# Patient Record
Sex: FEMALE | Race: WHITE | NOT HISPANIC OR LATINO | Employment: UNEMPLOYED | URBAN - METROPOLITAN AREA
[De-identification: names, ages, dates, MRNs, and addresses within clinical notes are randomized per-mention and may not be internally consistent; named-entity substitution may affect disease eponyms.]

---
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
## 2024-02-28 ENCOUNTER — OFFICE VISIT (OUTPATIENT)
Dept: GASTROENTEROLOGY | Facility: CLINIC | Age: 19
End: 2024-02-28
Payer: COMMERCIAL

## 2024-02-28 VITALS
OXYGEN SATURATION: 99 % | BODY MASS INDEX: 25.52 KG/M2 | TEMPERATURE: 98.8 F | SYSTOLIC BLOOD PRESSURE: 118 MMHG | DIASTOLIC BLOOD PRESSURE: 76 MMHG | HEIGHT: 64 IN | WEIGHT: 149.5 LBS | HEART RATE: 68 BPM

## 2024-02-28 DIAGNOSIS — K31.84 GASTROPARESIS: Primary | ICD-10-CM

## 2024-02-28 PROCEDURE — 99203 OFFICE O/P NEW LOW 30 MIN: CPT | Performed by: PHYSICIAN ASSISTANT

## 2024-02-28 NOTE — PROGRESS NOTES
[facility]  Vero Pichardo 18 y.o. female MRN: [id number]       CC: Follow-up    HPI: Vero is a pleasant 18 year old female with history of previously diagnosed idiopathic gastroparesis.  Patient was last seen in our office in December 2021 by my colleague, Kira Aaron PA-C.  Patient's last gastric emptying study was in October 2021.  Patient has not been seen in the office as she reports that her symptoms resolved.  She reports that she has not utilized pantoprazole since 2021.  She has no complaints of abdominal pain, nausea or vomiting.  Patient is studying at [employer], and is a part of the [employer] program.    Last EGD and colonoscopy were performed by Dr. Jimenez in 2021.  EGD was normal in appearance.  Colonoscopy was normal in appearance up to terminal ileum.    Review of Systems:    CONSTITUTIONAL: Denies any fever, chills, or rigors. Good appetite, and no recent weight loss.  HEENT: No earache or tinnitus. Denies hearing loss or visual disturbances.  CARDIOVASCULAR: No chest pain or palpitations.   RESPIRATORY: Denies any cough, hemoptysis, shortness of breath or dyspnea on exertion.  GASTROINTESTINAL: As noted in the History of Present Illness.   GENITOURINARY: No problems with urination. Denies any hematuria or dysuria.  NEUROLOGIC: No dizziness or vertigo, denies headaches.   MUSCULOSKELETAL: Denies any muscle or joint pain.   SKIN: Denies skin rashes or itching.   ENDOCRINE: Denies excessive thirst. Denies intolerance to heat or cold.  PSYCHOSOCIAL: Denies depression or anxiety. Denies any recent memory loss.       Current Outpatient Medications   Medication Sig Dispense Refill    albuterol (Ventolin HFA) 90 mcg/act inhaler Inhale 2 puffs every 4 (four) hours as needed for wheezing (Patient not taking: Reported on 1/9/2023) 54 g 1    dicyclomine (BENTYL) 20 mg tablet Take 1 tablet (20 mg total) by mouth every 6 (six) hours as needed (abdominal pain) (Patient  "not taking: Reported on 1/9/2023) 45 tablet 3    Lactase 9000 units TABS Take by mouth daily (Patient not taking: Reported on 1/9/2023)      pantoprazole (PROTONIX) 40 mg tablet Take 1 tablet (40 mg total) by mouth daily (Patient not taking: Reported on 1/9/2023) 90 tablet 0     No current facility-administered medications for this visit.     Past Medical History:   Diagnosis Date    Asthma     Kidney cysts     left    Lactose intolerance      Past Surgical History:   Procedure Laterality Date    EGD       Social History     Socioeconomic History    Marital status: Single     Spouse name: None    Number of children: None    Years of education: None    Highest education level: None   Occupational History    None   Tobacco Use    Smoking status: Never     Passive exposure: Yes    Smokeless tobacco: Never   Vaping Use    Vaping status: Never Used   Substance and Sexual Activity    Alcohol use: No    Drug use: Never    Sexual activity: None   Other Topics Concern    None   Social History Narrative    None     Social Determinants of Health     Financial Resource Strain: Not on file   Food Insecurity: Not on file   Transportation Needs: Not on file   Physical Activity: Not on file   Stress: Not on file   Social Connections: Not on file   Intimate Partner Violence: Not on file   Housing Stability: Not on file     Family History   Problem Relation Age of Onset    Asthma Mother     Diabetes Mother     Mental illness Mother         denied    Substance Abuse Mother         denied    Diabetes Father     Hypertension Father     Mental illness Father     Substance Abuse Father             PHYSICAL EXAM:    Vitals:    02/28/24 1339   BP: 118/76   Pulse: 68   Temp: 98.8 °F (37.1 °C)   TempSrc: Tympanic   SpO2: 99%   Weight: 67.8 kg (149 lb 8 oz)   Height: 5' 4\" (1.626 m)     General Appearance:   Alert and oriented x 3. Cooperative, and in no respiratory distress   HEENT:   Normocephalic, atraumatic, anicteric.     Neck:  Supple, " "symmetrical, trachea midline   Lungs:   Clear to auscultation bilaterally    Heart::   Regular rate and rhythm   Abdomen:   Soft, non-tender, non-distended; normal bowel sounds; no masses, no organomegaly    Genitalia:   Deferred    Rectal:   Deferred    Extremities:  No cyanosis, clubbing or edema    Pulses:  2+ and symmetric all extremities    Skin:  Skin color, texture, turgor normal, no rashes or lesions    Lymph nodes:  No palpable cervical or supraclavicular lymphadenopathy        Lab Results:             Invalid input(s): \"LABALBU\"            Imaging Studies:   No results found.    ASSESSMENT and PLAN:      1) Idiopathic gastroparesis - Patient's symptoms completely resolved.  She is doing well, and has no complaints at this time.  Patient does not require pantoprazole chronically.  Patient requiring documentation for the [employer] program, which I am more than happy with supplying stating that she is more than stable from a gastroparesis standpoint. She has not required any medication refills since 2021 or further follow-up.      Follow up as needed.      Portions of the record may have been created with voice recognition software.  Occasional wrong word or \"sound a like\" substitutions may have occurred due to the inherent limitations of voice recognition software.  Read the chart carefully and recognize, using context, where substitutions have occurred.  "

## 2024-10-22 ENCOUNTER — TELEPHONE (OUTPATIENT)
Dept: FAMILY MEDICINE CLINIC | Facility: CLINIC | Age: 19
End: 2024-10-22

## 2025-04-08 ENCOUNTER — OFFICE VISIT (OUTPATIENT)
Dept: URGENT CARE | Facility: CLINIC | Age: 20
End: 2025-04-08
Payer: COMMERCIAL

## 2025-04-08 VITALS
OXYGEN SATURATION: 98 % | SYSTOLIC BLOOD PRESSURE: 118 MMHG | RESPIRATION RATE: 18 BRPM | HEART RATE: 98 BPM | TEMPERATURE: 97.7 F | DIASTOLIC BLOOD PRESSURE: 64 MMHG

## 2025-04-08 DIAGNOSIS — A08.11 NOROVIRUS: Primary | ICD-10-CM

## 2025-04-08 PROCEDURE — 99213 OFFICE O/P EST LOW 20 MIN: CPT | Performed by: PHYSICAL MEDICINE & REHABILITATION

## 2025-04-08 NOTE — LETTER
April 8, 2025     Patient: Vero Pichardo   YOB: 2005   Date of Visit: 4/8/2025       To Whom it May Concern:    Vero Pichardo was seen in my clinic on 4/8/2025. She may return to school on 4/10/25 .    If you have any questions or concerns, please don't hesitate to call.         Sincerely,          Carleen Herrera PA-C        CC: No Recipients

## 2025-04-08 NOTE — PATIENT INSTRUCTIONS
Bananas, rice, applesauce, toast  Sip water every 10 minutes to stay hydrated  Eat broth based soup. May trial crackers such as saltine   May drink poweraid, gatoraid, Liquid IV, body-armour for added electrolytes

## 2025-04-08 NOTE — PROGRESS NOTES
[facility]        NAME: Vero Pichardo is a 19 y.o. female  : 2005    MRN: [id number]  DATE: 2025  TIME: 9:48 AM    Assessment and Plan   Norovirus [A08.11]  1. Norovirus          Discussed BRAT diet  Increase fluids   Patient has zofran at home  Needs work/school note      Patient Instructions     Bananas, rice, applesauce, toast  Sip water every 10 minutes to stay hydrated  Eat broth based soup. May trial crackers such as saltine   May drink poweraid, gatoraid, Liquid IV, body-armour for added electrolytes    Follow up with PCP in 3-5 days.  Proceed to  ER if symptoms worsen.    If tests are performed, our office will contact you with results only if changes need to made to the care plan discussed with you at the visit. [facility]    Chief Complaint     Chief Complaint   Patient presents with    Vomiting     Vomiting started last night. Has stomach pain. Nausea. No fever.         History of Present Illness       Pt is a 19 year old female presenting with generalized stomach pain, nausea, vomiting and diarrhea. Symptoms started 24. Patient notes girlfriends school is experiencing norovirus. Girlfriend does not currently have symptoms.    Vomiting   Associated symptoms include abdominal pain and diarrhea.       Review of Systems   Review of Systems   Constitutional: Negative.    Respiratory: Negative.     Cardiovascular: Negative.    Gastrointestinal:  Positive for abdominal pain, diarrhea, nausea and vomiting.         Current Medications       Current Outpatient Medications:     albuterol (Ventolin HFA) 90 mcg/act inhaler, Inhale 2 puffs every 4 (four) hours as needed for wheezing (Patient not taking: Reported on 2023), Disp: 54 g, Rfl: 1    dicyclomine (BENTYL) 20 mg tablet, Take 1 tablet (20 mg total) by mouth every 6 (six) hours as needed (abdominal pain) (Patient not taking: Reported on 2023), Disp: 45 tablet, Rfl: 3     Lactase 9000 units TABS, Take by mouth daily (Patient not taking: Reported on 1/9/2023), Disp: , Rfl:     pantoprazole (PROTONIX) 40 mg tablet, Take 1 tablet (40 mg total) by mouth daily (Patient not taking: Reported on 1/9/2023), Disp: 90 tablet, Rfl: 0    Current Allergies     Allergies as of 04/08/2025 - Reviewed 04/08/2025   Allergen Reaction Noted    Lactose - food allergy Diarrhea and Abdominal Pain 05/07/2021            The following portions of the patient's history were reviewed and updated as appropriate: allergies, current medications, past family history, past medical history, past social history, past surgical history and problem list.     Past Medical History:   Diagnosis Date    Asthma     Kidney cysts     left    Lactose intolerance        Past Surgical History:   Procedure Laterality Date    EGD         Family History   Problem Relation Age of Onset    Asthma Mother     Diabetes Mother     Mental illness Mother         denied    Substance Abuse Mother         denied    Diabetes Father     Hypertension Father     Mental illness Father     Substance Abuse Father          Medications have been verified.        Objective   /64   Pulse 98   Temp 97.7 °F (36.5 °C)   Resp 18   SpO2 98%        Physical Exam     Physical Exam  Vitals reviewed.   Constitutional:       Appearance: She is ill-appearing.   HENT:      Right Ear: Tympanic membrane normal.      Left Ear: Tympanic membrane normal.      Nose: Nose normal.      Mouth/Throat:      Mouth: Mucous membranes are moist.      Pharynx: Oropharynx is clear.   Cardiovascular:      Rate and Rhythm: Normal rate and regular rhythm.      Pulses: Normal pulses.      Heart sounds: Normal heart sounds.   Pulmonary:      Effort: Pulmonary effort is normal.      Breath sounds: Normal breath sounds.   Abdominal:      General: Bowel sounds are normal. There is no distension.      Palpations: Abdomen is soft.      Tenderness: There is abdominal tenderness in the  epigastric area.   Skin:     Coloration: Skin is pale.   Neurological:      Mental Status: She is alert.

## 2025-04-08 NOTE — LETTER
April 8, 2025     Patient: Vero Pichardo   YOB: 2005   Date of Visit: 4/8/2025       To Whom it May Concern:    Vero Pichardo was seen in my clinic on 4/8/2025. She may return to work on 4/10/25 .    If you have any questions or concerns, please don't hesitate to call.         Sincerely,          Carleen Herrera PA-C        CC: No Recipients

## 2025-05-06 PROBLEM — R35.89 POLYURIA: Status: RESOLVED | Noted: 2019-12-05 | Resolved: 2025-05-06

## 2025-05-06 PROBLEM — Z23 NEED FOR INFLUENZA VACCINATION: Status: RESOLVED | Noted: 2018-12-04 | Resolved: 2025-05-06

## 2025-05-06 PROBLEM — Z23 ENCOUNTER FOR IMMUNIZATION: Status: RESOLVED | Noted: 2018-12-04 | Resolved: 2025-05-06

## 2025-05-06 NOTE — PROGRESS NOTES
"Name: Vero Pichardo      : 2005      MRN: [id number]  Encounter Provider: Marcy Parmar DO  Encounter Date: 2025   Encounter department: [facility]  :  Assessment & Plan  Healthcare maintenance  BP WNL   CMP + Lipids to screen for HLD, DM   Vaccinations: TDap UTD   Encouraged regular physical activity, varied diet, and regular dental/eye exams          Screening for diabetes mellitus    Orders:  •  Comprehensive metabolic panel; Future    Screening, lipid    Orders:  •  Lipid panel; Future           History of Present Illness   HPI    Pt presents for annual physical     Review of Systems   Constitutional:  Negative for unexpected weight change.   HENT:  Negative for congestion, ear pain, rhinorrhea and sore throat.    Eyes:  Negative for visual disturbance.   Respiratory:  Negative for cough and shortness of breath.    Cardiovascular:  Negative for chest pain, palpitations and leg swelling.   Gastrointestinal:  Positive for diarrhea (with dairy). Negative for abdominal pain, blood in stool and constipation.   Endocrine: Negative for polyuria.   Genitourinary:  Negative for dysuria, hematuria and menstrual problem (sometimes regular/sometimes not).   Neurological:  Negative for dizziness and headaches.   Psychiatric/Behavioral:  Positive for sleep disturbance (\"on and off\").        Objective   /70   Pulse 62   Temp 97.7 °F (36.5 °C)   Ht 5' 4\" (1.626 m)   Wt 72.6 kg (160 lb)   SpO2 98%   BMI 27.46 kg/m²      Physical Exam  Vitals and nursing note reviewed.   Constitutional:       General: She is not in acute distress.     Appearance: She is well-developed.   HENT:      Head: Normocephalic and atraumatic.      Right Ear: Tympanic membrane, ear canal and external ear normal.      Left Ear: Tympanic membrane, ear canal and external ear normal.      Nose: Nose normal. No rhinorrhea.      Mouth/Throat:      Mouth: Mucous membranes are moist.      Pharynx: No " oropharyngeal exudate or posterior oropharyngeal erythema.   Eyes:      Conjunctiva/sclera: Conjunctivae normal.   Cardiovascular:      Rate and Rhythm: Normal rate and regular rhythm.   Pulmonary:      Effort: Pulmonary effort is normal. No respiratory distress.      Breath sounds: Normal breath sounds.   Abdominal:      General: Bowel sounds are normal. There is no distension.      Palpations: Abdomen is soft.      Tenderness: There is no abdominal tenderness.   Musculoskeletal:      Right lower leg: No edema.      Left lower leg: No edema.   Lymphadenopathy:      Cervical: No cervical adenopathy.   Skin:     General: Skin is warm and dry.   Neurological:      Mental Status: She is alert.      Comments: Grossly intact   Psychiatric:         Mood and Affect: Mood normal.

## 2025-05-07 ENCOUNTER — OFFICE VISIT (OUTPATIENT)
Dept: FAMILY MEDICINE CLINIC | Facility: CLINIC | Age: 20
End: 2025-05-07
Payer: COMMERCIAL

## 2025-05-07 ENCOUNTER — APPOINTMENT (OUTPATIENT)
Dept: LAB | Facility: CLINIC | Age: 20
End: 2025-05-07
Payer: COMMERCIAL

## 2025-05-07 VITALS
HEART RATE: 62 BPM | WEIGHT: 160 LBS | OXYGEN SATURATION: 98 % | DIASTOLIC BLOOD PRESSURE: 70 MMHG | SYSTOLIC BLOOD PRESSURE: 108 MMHG | TEMPERATURE: 97.7 F | BODY MASS INDEX: 27.31 KG/M2 | HEIGHT: 64 IN

## 2025-05-07 DIAGNOSIS — Z13.220 SCREENING, LIPID: ICD-10-CM

## 2025-05-07 DIAGNOSIS — Z13.1 SCREENING FOR DIABETES MELLITUS: ICD-10-CM

## 2025-05-07 DIAGNOSIS — Z00.00 HEALTHCARE MAINTENANCE: Primary | ICD-10-CM

## 2025-05-07 LAB
ALBUMIN SERPL BCG-MCNC: 4.6 G/DL (ref 3.5–5)
ALP SERPL-CCNC: 52 U/L (ref 34–104)
ALT SERPL W P-5'-P-CCNC: 14 U/L (ref 7–52)
ANION GAP SERPL CALCULATED.3IONS-SCNC: 8 MMOL/L (ref 4–13)
AST SERPL W P-5'-P-CCNC: 16 U/L (ref 13–39)
BILIRUB SERPL-MCNC: 0.34 MG/DL (ref 0.2–1)
BUN SERPL-MCNC: 15 MG/DL (ref 5–25)
CALCIUM SERPL-MCNC: 9.4 MG/DL (ref 8.4–10.2)
CHLORIDE SERPL-SCNC: 104 MMOL/L (ref 96–108)
CHOLEST SERPL-MCNC: 155 MG/DL (ref ?–200)
CO2 SERPL-SCNC: 29 MMOL/L (ref 21–32)
CREAT SERPL-MCNC: 0.55 MG/DL (ref 0.6–1.3)
GFR SERPL CREATININE-BSD FRML MDRD: 135 ML/MIN/1.73SQ M
GLUCOSE P FAST SERPL-MCNC: 94 MG/DL (ref 65–99)
HDLC SERPL-MCNC: 69 MG/DL
LDLC SERPL CALC-MCNC: 72 MG/DL (ref 0–100)
NONHDLC SERPL-MCNC: 86 MG/DL
POTASSIUM SERPL-SCNC: 4.3 MMOL/L (ref 3.5–5.3)
PROT SERPL-MCNC: 7.1 G/DL (ref 6.4–8.4)
SODIUM SERPL-SCNC: 141 MMOL/L (ref 135–147)
TRIGL SERPL-MCNC: 68 MG/DL (ref ?–150)

## 2025-05-07 PROCEDURE — 99395 PREV VISIT EST AGE 18-39: CPT | Performed by: FAMILY MEDICINE

## 2025-05-07 PROCEDURE — 36415 COLL VENOUS BLD VENIPUNCTURE: CPT

## 2025-05-07 PROCEDURE — 80061 LIPID PANEL: CPT

## 2025-05-07 PROCEDURE — 80053 COMPREHEN METABOLIC PANEL: CPT

## 2025-05-08 ENCOUNTER — RESULTS FOLLOW-UP (OUTPATIENT)
Dept: FAMILY MEDICINE CLINIC | Facility: CLINIC | Age: 20
End: 2025-05-08

## 2025-05-08 ENCOUNTER — TELEPHONE (OUTPATIENT)
Age: 20
End: 2025-05-08

## 2025-05-08 NOTE — TELEPHONE ENCOUNTER
Rec'd call from patient requesting to schedule NP appt for acne. Preferred office is . Offered next available. Patient declined. Offered to check AP schedules. Patient declined scheduling at this time. Will call back if she changes her mind.

## 2025-06-21 ENCOUNTER — OFFICE VISIT (OUTPATIENT)
Dept: URGENT CARE | Facility: CLINIC | Age: 20
End: 2025-06-21
Payer: COMMERCIAL

## 2025-06-21 VITALS
DIASTOLIC BLOOD PRESSURE: 74 MMHG | BODY MASS INDEX: 27.46 KG/M2 | OXYGEN SATURATION: 99 % | WEIGHT: 160 LBS | SYSTOLIC BLOOD PRESSURE: 125 MMHG | HEART RATE: 80 BPM

## 2025-06-21 DIAGNOSIS — R25.3 MUSCLE TWITCHING: Primary | ICD-10-CM

## 2025-06-21 DIAGNOSIS — G47.9 TROUBLE IN SLEEPING: ICD-10-CM

## 2025-06-21 PROCEDURE — 99213 OFFICE O/P EST LOW 20 MIN: CPT | Performed by: NURSE PRACTITIONER

## 2025-06-21 NOTE — PROGRESS NOTES
"[facility]  Name: Vero Pichardo      : 2005      MRN: [id number]  Encounter Provider: ERIK Sparks  Encounter Date: 2025   Encounter department: [facility] [place]  :  Assessment & Plan  Muscle twitching         Trouble in sleeping           Patient Instructions  Start magnesium supplementation daily   Continue adequate hydration with water   Can trial additional melatonin HS PRN to aid sleeping   Call PCP on Monday for further work up/blood work   Proceed to ER should symptom worsen     If tests are performed, our office will contact you with results only if changes need to made to the care plan discussed with you at the visit. You can review your full results on [facility].    Chief Complaint:   Chief Complaint   Patient presents with    Dizziness     Pt is here because she states she can see her blood pumping in her arm, high blood pressure, lightheadedness and tingling in her left side  can not sleep for the past week. Pt states that her highest /93     History of Present Illness   21 y/o female presents for reported high blood pressure, \"blood pumping through her\" occasional lightheadedness, trouble sleeping and muscle twitching x 1 week. Patient reports working all the time and never stopping. She also recently stopped drinking an energy drink that she used to drink daily. She denies hx of HTN or heart issues.           Review of Systems   Constitutional:  Positive for fatigue.   Musculoskeletal:  Positive for myalgias.   Neurological:  Positive for light-headedness.   All other systems reviewed and are negative.    Past Medical History   Past Medical History[1]  Past Surgical History[2]  Family History[3]  she reports that she has never smoked. She has been exposed to tobacco smoke. She has never used smokeless tobacco. She reports that she does not drink alcohol and does not use drugs.  No current outpatient medicationsAllergies[4] " "    Objective   /74 (Patient Position: Sitting, Cuff Size: Standard)   Pulse 80   Wt 72.6 kg (160 lb)   SpO2 99%   BMI 27.46 kg/m²      Physical Exam  Constitutional:       Appearance: Normal appearance.   HENT:      Head: Normocephalic and atraumatic.      Right Ear: External ear normal.      Left Ear: External ear normal.      Nose: Nose normal.      Mouth/Throat:      Mouth: Mucous membranes are moist.      Pharynx: Oropharynx is clear.     Eyes:      Extraocular Movements: Extraocular movements intact.      Conjunctiva/sclera: Conjunctivae normal.      Pupils: Pupils are equal, round, and reactive to light.       Cardiovascular:      Rate and Rhythm: Normal rate and regular rhythm.      Pulses: Normal pulses.      Heart sounds: Normal heart sounds. No murmur heard.  Pulmonary:      Effort: Pulmonary effort is normal.   Abdominal:      Palpations: Abdomen is soft.     Musculoskeletal:         General: Normal range of motion.      Cervical back: Normal range of motion.     Skin:     General: Skin is warm and dry.     Neurological:      General: No focal deficit present.      Mental Status: She is alert and oriented to person, place, and time.      Sensory: No sensory deficit.      Motor: No weakness.      Coordination: Coordination normal.      Gait: Gait normal.     Psychiatric:         Behavior: Behavior normal.         Thought Content: Thought content normal.         Portions of the record may have been created with voice recognition software.  Occasional wrong word or \"sound a like\" substitutions may have occurred due to the inherent limitations of voice recognition software.  Read the chart carefully and recognize, using context, where substitutions have occurred.       [1]   Past Medical History:  Diagnosis Date    Asthma     Kidney cysts     left    Lactose intolerance    [2]   Past Surgical History:  Procedure Laterality Date    EGD     [3]   Family History  Problem Relation Name Age of Onset    " Asthma Mother      Diabetes Mother      Mental illness Mother          denied    Substance Abuse Mother          denied    Diabetes Father      Hypertension Father      Mental illness Father      Substance Abuse Father     [4]   Allergies  Allergen Reactions    Lactose - Food Allergy Diarrhea and Abdominal Pain

## 2025-06-21 NOTE — PATIENT INSTRUCTIONS
Start magnesium supplementation daily   Continue adequate hydration with water   Can trial additional melatonin HS PRN to aid sleeping   Call PCP on Monday for further work up/blood work   Proceed to ER should symptom worsen